# Patient Record
Sex: MALE | Race: WHITE
[De-identification: names, ages, dates, MRNs, and addresses within clinical notes are randomized per-mention and may not be internally consistent; named-entity substitution may affect disease eponyms.]

---

## 2018-06-04 ENCOUNTER — HOSPITAL ENCOUNTER (EMERGENCY)
Dept: HOSPITAL 17 - NEPD | Age: 53
Discharge: LEFT BEFORE DISCHARGE | End: 2018-06-04
Payer: COMMERCIAL

## 2018-06-04 VITALS
DIASTOLIC BLOOD PRESSURE: 94 MMHG | OXYGEN SATURATION: 99 % | TEMPERATURE: 98.4 F | SYSTOLIC BLOOD PRESSURE: 166 MMHG | HEART RATE: 91 BPM | RESPIRATION RATE: 16 BRPM

## 2018-06-04 DIAGNOSIS — R51: Primary | ICD-10-CM

## 2018-06-04 PROCEDURE — 99281 EMR DPT VST MAYX REQ PHY/QHP: CPT
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
